# Patient Record
Sex: FEMALE | Race: BLACK OR AFRICAN AMERICAN | URBAN - METROPOLITAN AREA
[De-identification: names, ages, dates, MRNs, and addresses within clinical notes are randomized per-mention and may not be internally consistent; named-entity substitution may affect disease eponyms.]

---

## 2022-01-13 ENCOUNTER — OFFICE VISIT (OUTPATIENT)
Dept: URBAN - METROPOLITAN AREA CLINIC 98 | Facility: CLINIC | Age: 57
End: 2022-01-13

## 2022-01-14 ENCOUNTER — TELEPHONE ENCOUNTER (OUTPATIENT)
Dept: URBAN - METROPOLITAN AREA CLINIC 98 | Facility: CLINIC | Age: 57
End: 2022-01-14

## 2022-01-18 ENCOUNTER — WEB ENCOUNTER (OUTPATIENT)
Dept: URBAN - METROPOLITAN AREA CLINIC 98 | Facility: CLINIC | Age: 57
End: 2022-01-18

## 2022-01-20 ENCOUNTER — WEB ENCOUNTER (OUTPATIENT)
Dept: URBAN - METROPOLITAN AREA CLINIC 98 | Facility: CLINIC | Age: 57
End: 2022-01-20

## 2022-01-20 ENCOUNTER — OFFICE VISIT (OUTPATIENT)
Dept: URBAN - METROPOLITAN AREA CLINIC 98 | Facility: CLINIC | Age: 57
End: 2022-01-20
Payer: COMMERCIAL

## 2022-01-20 DIAGNOSIS — R14.0 ABDOMINAL BLOATING: ICD-10-CM

## 2022-01-20 DIAGNOSIS — K21.9 GERD: ICD-10-CM

## 2022-01-20 DIAGNOSIS — R19.4 CHANGE IN BOWEL HABIT: ICD-10-CM

## 2022-01-20 PROCEDURE — 99214 OFFICE O/P EST MOD 30 MIN: CPT | Performed by: INTERNAL MEDICINE

## 2022-01-20 RX ORDER — LEVOTHYROXINE SODIUM 0.07 MG/1
TAKE 1 TABLET (50 MCG) BY ORAL ROUTE ONCE DAILY TABLET ORAL 1
Qty: 0 | Refills: 0 | Status: ACTIVE | COMMUNITY
Start: 1900-01-01

## 2022-01-20 RX ORDER — METOPROLOL SUCCINATE 100 MG/1
TAKE 1 TABLET (100 MG) BY ORAL ROUTE ONCE DAILY TABLET, EXTENDED RELEASE ORAL 1
Qty: 0 | Refills: 0 | Status: ACTIVE | COMMUNITY
Start: 1900-01-01

## 2022-01-20 NOTE — HPI-TODAY'S VISIT:
Ms. Rodarte is a 57 yo F presenting for followup.   She was a patient of Dr. Bejarano. She has had GERD for years and was diagnosed with esophagitis and GERD on EGD in 2019. Over the holidays she had increased alcohol intake and feels she indulged on foods she does not normally eat. She experienced gas, abdominal bloating, and increased BMs that were loose. She had some acid reflux.   She is still having some abdominal distress. She is no longer having diarrhea. She is having soft-serve consistency stools. For the past 2 weeks she has increased smoothies and healthier foods. She is still having gas and abdominal bloating most days of the week.   She has tried Gaviscon which has helped her symptoms and is on protonix 20 mg PO daily. She has seen a few bubbles in her urine but no blood and no dysuria.   I personally reviewed: 10/21/2019 colonoscopy with polyp removed. Family history of colon cancer 10/21/2019 EGD: erythema at GEJ,  biopsied 10/21/2019 EGD path: reflux esophagitis. Stomach biopsies without H pylori, chronic inactive gastritis

## 2022-01-22 LAB
DEAMIDATED GLIADIN ABS, IGA: 7
IMMUNOGLOBULIN A, QN, SERUM: 468
T-TRANSGLUTAMINASE (TTG) IGA: 3

## 2022-01-28 ENCOUNTER — OFFICE VISIT (OUTPATIENT)
Dept: URBAN - METROPOLITAN AREA CLINIC 84 | Facility: CLINIC | Age: 57
End: 2022-01-28

## 2022-03-17 ENCOUNTER — TELEPHONE ENCOUNTER (OUTPATIENT)
Dept: URBAN - METROPOLITAN AREA CLINIC 98 | Facility: CLINIC | Age: 57
End: 2022-03-17

## 2022-07-06 ENCOUNTER — OFFICE VISIT (OUTPATIENT)
Dept: URBAN - METROPOLITAN AREA CLINIC 92 | Facility: CLINIC | Age: 57
End: 2022-07-06

## 2022-07-06 VITALS
DIASTOLIC BLOOD PRESSURE: 93 MMHG | HEIGHT: 61 IN | BODY MASS INDEX: 34.93 KG/M2 | HEART RATE: 54 BPM | SYSTOLIC BLOOD PRESSURE: 160 MMHG | WEIGHT: 185 LBS | TEMPERATURE: 97 F

## 2022-07-06 RX ORDER — METOPROLOL SUCCINATE 100 MG/1
TAKE 1 TABLET (100 MG) BY ORAL ROUTE ONCE DAILY TABLET, EXTENDED RELEASE ORAL 1
Qty: 0 | Refills: 0 | Status: ACTIVE | COMMUNITY
Start: 1900-01-01

## 2022-07-06 RX ORDER — LEVOTHYROXINE SODIUM 0.07 MG/1
TAKE 1 TABLET (50 MCG) BY ORAL ROUTE ONCE DAILY TABLET ORAL 1
Qty: 0 | Refills: 0 | Status: ACTIVE | COMMUNITY
Start: 1900-01-01

## 2022-08-18 ENCOUNTER — OFFICE VISIT (OUTPATIENT)
Dept: URBAN - METROPOLITAN AREA TELEHEALTH 2 | Facility: TELEHEALTH | Age: 57
End: 2022-08-18
Payer: COMMERCIAL

## 2022-08-18 VITALS — BODY MASS INDEX: 32.59 KG/M2 | WEIGHT: 172.6 LBS | HEIGHT: 61 IN

## 2022-08-18 DIAGNOSIS — R19.4 CHANGE IN BOWEL HABIT: ICD-10-CM

## 2022-08-18 DIAGNOSIS — Z83.71 FAMILY HISTORY OF COLONIC POLYPS: ICD-10-CM

## 2022-08-18 DIAGNOSIS — K21.9 GERD: ICD-10-CM

## 2022-08-18 DIAGNOSIS — R14.0 ABDOMINAL BLOATING: ICD-10-CM

## 2022-08-18 PROCEDURE — 99213 OFFICE O/P EST LOW 20 MIN: CPT | Performed by: INTERNAL MEDICINE

## 2022-08-18 RX ORDER — LEVOTHYROXINE SODIUM 0.07 MG/1
TAKE 1 TABLET (50 MCG) BY ORAL ROUTE ONCE DAILY TABLET ORAL 1
Qty: 0 | Refills: 0 | Status: ACTIVE | COMMUNITY
Start: 1900-01-01

## 2022-08-18 RX ORDER — METOPROLOL SUCCINATE 100 MG/1
TAKE 1 TABLET (100 MG) BY ORAL ROUTE ONCE DAILY TABLET, EXTENDED RELEASE ORAL 1
Qty: 0 | Refills: 0 | Status: ACTIVE | COMMUNITY
Start: 1900-01-01

## 2022-08-18 NOTE — HPI-TODAY'S VISIT:
58 yo female with long standing GERD. taking protonix 40 mg BID. doing dietary modification. exacerbated by etoh. only breakthrough with dietary lapse. BM normal

## 2022-12-14 ENCOUNTER — TELEPHONE ENCOUNTER (OUTPATIENT)
Dept: URBAN - METROPOLITAN AREA CLINIC 98 | Facility: CLINIC | Age: 57
End: 2022-12-14

## 2022-12-15 ENCOUNTER — OFFICE VISIT (OUTPATIENT)
Dept: URBAN - METROPOLITAN AREA CLINIC 105 | Facility: CLINIC | Age: 57
End: 2022-12-15

## 2022-12-19 ENCOUNTER — OFFICE VISIT (OUTPATIENT)
Dept: URBAN - METROPOLITAN AREA TELEHEALTH 2 | Facility: TELEHEALTH | Age: 57
End: 2022-12-19
Payer: COMMERCIAL

## 2022-12-19 VITALS — BODY MASS INDEX: 33.99 KG/M2 | WEIGHT: 180 LBS | HEIGHT: 61 IN

## 2022-12-19 DIAGNOSIS — K21.9 GERD: ICD-10-CM

## 2022-12-19 DIAGNOSIS — R14.0 ABDOMINAL BLOATING: ICD-10-CM

## 2022-12-19 PROBLEM — 414916001: Status: ACTIVE | Noted: 2022-12-19

## 2022-12-19 PROBLEM — 162864005: Status: ACTIVE | Noted: 2022-12-19

## 2022-12-19 PROCEDURE — 99214 OFFICE O/P EST MOD 30 MIN: CPT | Performed by: INTERNAL MEDICINE

## 2022-12-19 RX ORDER — OMEPRAZOLE 40 MG/1
1 CAPSULE 30 MINUTES BEFORE MORNING MEAL CAPSULE, DELAYED RELEASE ORAL TWICE A DAY
Qty: 60 | Refills: 3 | OUTPATIENT
Start: 2022-12-19

## 2022-12-19 RX ORDER — LEVOTHYROXINE SODIUM 0.07 MG/1
TAKE 1 TABLET (50 MCG) BY ORAL ROUTE ONCE DAILY TABLET ORAL 1
Qty: 0 | Refills: 0 | Status: ACTIVE | COMMUNITY
Start: 1900-01-01

## 2022-12-19 RX ORDER — METOPROLOL SUCCINATE 100 MG/1
TAKE 1 TABLET (100 MG) BY ORAL ROUTE ONCE DAILY TABLET, EXTENDED RELEASE ORAL 1
Qty: 0 | Refills: 0 | Status: ACTIVE | COMMUNITY
Start: 1900-01-01

## 2023-02-16 ENCOUNTER — OFFICE VISIT (OUTPATIENT)
Dept: URBAN - METROPOLITAN AREA CLINIC 105 | Facility: CLINIC | Age: 58
End: 2023-02-16

## 2023-03-16 ENCOUNTER — OFFICE VISIT (OUTPATIENT)
Dept: URBAN - METROPOLITAN AREA CLINIC 105 | Facility: CLINIC | Age: 58
End: 2023-03-16

## 2023-10-20 ENCOUNTER — LAB OUTSIDE AN ENCOUNTER (OUTPATIENT)
Dept: URBAN - METROPOLITAN AREA CLINIC 105 | Facility: CLINIC | Age: 58
End: 2023-10-20

## 2023-10-20 ENCOUNTER — OFFICE VISIT (OUTPATIENT)
Dept: URBAN - METROPOLITAN AREA CLINIC 105 | Facility: CLINIC | Age: 58
End: 2023-10-20
Payer: COMMERCIAL

## 2023-10-20 VITALS
HEIGHT: 61 IN | TEMPERATURE: 97.6 F | SYSTOLIC BLOOD PRESSURE: 173 MMHG | DIASTOLIC BLOOD PRESSURE: 83 MMHG | WEIGHT: 167 LBS | HEART RATE: 59 BPM | BODY MASS INDEX: 31.53 KG/M2

## 2023-10-20 DIAGNOSIS — E66.09 OTHER OBESITY DUE TO EXCESS CALORIES: ICD-10-CM

## 2023-10-20 DIAGNOSIS — R14.0 ABDOMINAL BLOATING: ICD-10-CM

## 2023-10-20 DIAGNOSIS — K21.9 GERD: ICD-10-CM

## 2023-10-20 PROBLEM — 428283002: Status: ACTIVE | Noted: 2023-10-20

## 2023-10-20 PROCEDURE — 99214 OFFICE O/P EST MOD 30 MIN: CPT | Performed by: INTERNAL MEDICINE

## 2023-10-20 RX ORDER — SUCRALFATE 1 G/1
1 TABLET ON AN EMPTY STOMACH TABLET ORAL TWICE A DAY
Qty: 60 | Refills: 1 | OUTPATIENT
Start: 2023-10-20 | End: 2023-12-18

## 2023-10-20 RX ORDER — PANTOPRAZOLE SODIUM 40 MG/1
1 TABLET TABLET, DELAYED RELEASE ORAL TWICE A DAY
Status: ACTIVE | COMMUNITY
Start: 2023-10-20

## 2023-10-20 RX ORDER — METOPROLOL SUCCINATE 100 MG/1
TAKE 1 TABLET (100 MG) BY ORAL ROUTE ONCE DAILY TABLET, EXTENDED RELEASE ORAL 1
Qty: 0 | Refills: 0 | Status: ACTIVE | COMMUNITY
Start: 1900-01-01

## 2023-10-20 RX ORDER — FAMOTIDINE 40 MG/1
1 TABLET AT BEDTIME TABLET, FILM COATED ORAL ONCE A DAY
Status: ACTIVE | COMMUNITY
Start: 2023-10-20

## 2023-10-20 RX ORDER — LEVOTHYROXINE SODIUM 0.07 MG/1
TAKE 1 TABLET (50 MCG) BY ORAL ROUTE ONCE DAILY TABLET ORAL 1
Qty: 0 | Refills: 0 | Status: ACTIVE | COMMUNITY
Start: 1900-01-01

## 2023-10-20 RX ORDER — OMEPRAZOLE 40 MG/1
1 CAPSULE 30 MINUTES BEFORE MORNING MEAL CAPSULE, DELAYED RELEASE ORAL TWICE A DAY
Qty: 60 | Refills: 3 | Status: ON HOLD | COMMUNITY
Start: 2022-12-19

## 2023-10-20 NOTE — HPI-TODAY'S VISIT:
Patient is here as a follow up for GERD. Previously seen by Dr. Goins. For the past 2 weeks, patient has been experiencing heartburn, abdominal gas, "gurgling", gas "pains". Feels like the gastritis she has had in the past. Symptoms are calmed with eating. Symptoms restart shortly after. "When I swallow, I feel discomfort when it travels down". Epigastric pressure.  The heartburn was worse at night. Gaviscon used to help in the past, but she opted out of taking it this time. She now uses Pepcid at night for the past 5 days with some relief.  Denies regurgitation, melena and unintentional weight loss.  Currently, taking Pantoprazole 40 mg BID and Pepcid 40 mg at night. Had her annual physical on Monday the lab work was WNL (I price reviewed this on [facility]).  Last EGD 10/2019 with Dr. Bejarano showed chronic gastritis and esophagitis at GE junction.  No longer doing the juice plus. It was 2 expensive. She is now taking [place] whole food vitamins and eating vegetables. She still avoids the vegetables that causes bloating.  Reports seeing "pencil thin" stools for the past 2 weeks. Denies rectal bleeding.

## 2023-11-29 ENCOUNTER — CLAIMS CREATED FROM THE CLAIM WINDOW (OUTPATIENT)
Dept: URBAN - METROPOLITAN AREA SURGERY CENTER 16 | Facility: SURGERY CENTER | Age: 58
End: 2023-11-29
Payer: COMMERCIAL

## 2023-11-29 ENCOUNTER — CLAIMS CREATED FROM THE CLAIM WINDOW (OUTPATIENT)
Dept: URBAN - METROPOLITAN AREA CLINIC 4 | Facility: CLINIC | Age: 58
End: 2023-11-29
Payer: COMMERCIAL

## 2023-11-29 DIAGNOSIS — R10.13 ABDOMINAL DISCOMFORT, EPIGASTRIC: ICD-10-CM

## 2023-11-29 DIAGNOSIS — R13.19 CERVICAL DYSPHAGIA: ICD-10-CM

## 2023-11-29 DIAGNOSIS — K20.80 ESOPHAGITIS DISSECANS SUPERFICIALIS: ICD-10-CM

## 2023-11-29 DIAGNOSIS — Z12.11 COLON CANCER SCREENING: ICD-10-CM

## 2023-11-29 DIAGNOSIS — K30 ACID INDIGESTION: ICD-10-CM

## 2023-11-29 DIAGNOSIS — K44.9 DIAPHRAGMATIC HERNIA: ICD-10-CM

## 2023-11-29 DIAGNOSIS — K57.30 ACQUIRED DIVERTICULOSIS OF COLON: ICD-10-CM

## 2023-11-29 DIAGNOSIS — K31.89 ACHYLIA: ICD-10-CM

## 2023-11-29 DIAGNOSIS — K64.8 EXTERNAL HEMORRHOIDS: ICD-10-CM

## 2023-11-29 DIAGNOSIS — D17.5 BENIGN LIPOMATOUS NEOPLASM OF INTRA-ABDOMINAL ORGANS: ICD-10-CM

## 2023-11-29 DIAGNOSIS — K63.5 BENIGN COLON POLYP: ICD-10-CM

## 2023-11-29 DIAGNOSIS — K29.70 GASTRITIS, UNSPECIFIED, WITHOUT BLEEDING: ICD-10-CM

## 2023-11-29 DIAGNOSIS — K31.7 BENIGN GASTRIC POLYP: ICD-10-CM

## 2023-11-29 PROCEDURE — 00813 ANES UPR LWR GI NDSC PX: CPT

## 2023-11-29 PROCEDURE — 00813 ANES UPR LWR GI NDSC PX: CPT | Performed by: ANESTHESIOLOGY

## 2023-11-29 PROCEDURE — 43239 EGD BIOPSY SINGLE/MULTIPLE: CPT | Performed by: INTERNAL MEDICINE

## 2023-11-29 PROCEDURE — 45380 COLONOSCOPY AND BIOPSY: CPT | Performed by: INTERNAL MEDICINE

## 2023-11-29 PROCEDURE — G8907 PT DOC NO EVENTS ON DISCHARG: HCPCS | Performed by: INTERNAL MEDICINE

## 2023-11-29 PROCEDURE — 88305 TISSUE EXAM BY PATHOLOGIST: CPT | Performed by: PATHOLOGY

## 2023-11-29 PROCEDURE — 88312 SPECIAL STAINS GROUP 1: CPT | Performed by: PATHOLOGY

## 2023-12-08 ENCOUNTER — TELEPHONE ENCOUNTER (OUTPATIENT)
Dept: URBAN - METROPOLITAN AREA CLINIC 105 | Facility: CLINIC | Age: 58
End: 2023-12-08

## 2023-12-21 ENCOUNTER — OFFICE VISIT (OUTPATIENT)
Dept: URBAN - METROPOLITAN AREA CLINIC 105 | Facility: CLINIC | Age: 58
End: 2023-12-21
Payer: COMMERCIAL

## 2023-12-21 VITALS
HEIGHT: 61 IN | DIASTOLIC BLOOD PRESSURE: 86 MMHG | HEART RATE: 56 BPM | TEMPERATURE: 96.6 F | BODY MASS INDEX: 31.15 KG/M2 | WEIGHT: 165 LBS | SYSTOLIC BLOOD PRESSURE: 147 MMHG

## 2023-12-21 DIAGNOSIS — K44.9 HIATAL HERNIA: ICD-10-CM

## 2023-12-21 DIAGNOSIS — D17.79 LIPOMA OF OTHER SPECIFIED SITES: ICD-10-CM

## 2023-12-21 DIAGNOSIS — K21.9 CHRONIC GERD: ICD-10-CM

## 2023-12-21 DIAGNOSIS — E66.01 MORBID OBESITY: ICD-10-CM

## 2023-12-21 PROBLEM — 238136002: Status: ACTIVE | Noted: 2023-12-21

## 2023-12-21 PROBLEM — 235595009: Status: ACTIVE | Noted: 2023-12-21

## 2023-12-21 PROCEDURE — 99214 OFFICE O/P EST MOD 30 MIN: CPT | Performed by: INTERNAL MEDICINE

## 2023-12-21 RX ORDER — PANTOPRAZOLE SODIUM 40 MG/1
1 TABLET TABLET, DELAYED RELEASE ORAL TWICE A DAY
Status: ACTIVE | COMMUNITY
Start: 2023-10-20

## 2023-12-21 RX ORDER — LEVOTHYROXINE SODIUM 0.07 MG/1
TAKE 1 TABLET (50 MCG) BY ORAL ROUTE ONCE DAILY TABLET ORAL 1
Qty: 0 | Refills: 0 | Status: ACTIVE | COMMUNITY
Start: 1900-01-01

## 2023-12-21 RX ORDER — FAMOTIDINE 40 MG/1
1 TABLET AT BEDTIME TABLET, FILM COATED ORAL ONCE A DAY
Status: ACTIVE | COMMUNITY
Start: 2023-10-20

## 2023-12-21 RX ORDER — METOPROLOL SUCCINATE 100 MG/1
TAKE 1 TABLET (100 MG) BY ORAL ROUTE ONCE DAILY TABLET, EXTENDED RELEASE ORAL 1
Qty: 0 | Refills: 0 | Status: ACTIVE | COMMUNITY
Start: 1900-01-01

## 2023-12-21 NOTE — HPI-TODAY'S VISIT:
The pt has a history of GERD, hiatal hernia and obesiy who presents for f/u office visit. The pt is s/p El/C 11/23 + for hiatal hernia, GERD, gastritis and colon + for lipoma. The pt continues to struggle with weight loss and she notes that she does exercise but needs to lose weight. The pt notes that she is trying to lose weight and does not exercise consistently.   Time of visit DOS is 30 minutes after review of all old records.

## 2023-12-21 NOTE — PHYSICAL EXAM HENT:
Head, normocephalic, atraumatic, Face, Face within normal limits, Ears, External ears within normal limits, Nose/Nasopharynx, External nose normal appearance, nares patent, no nasal discharge, Mouth and Throat, Oral cavity appearance normal, Lips, Appearance normal
yes

## 2024-04-18 ENCOUNTER — OV EP (OUTPATIENT)
Dept: URBAN - METROPOLITAN AREA CLINIC 105 | Facility: CLINIC | Age: 59
End: 2024-04-18
Payer: COMMERCIAL

## 2024-04-18 VITALS
TEMPERATURE: 97 F | SYSTOLIC BLOOD PRESSURE: 134 MMHG | WEIGHT: 167 LBS | HEIGHT: 61 IN | DIASTOLIC BLOOD PRESSURE: 85 MMHG | HEART RATE: 62 BPM | BODY MASS INDEX: 31.53 KG/M2

## 2024-04-18 DIAGNOSIS — E66.01 MORBID OBESITY: ICD-10-CM

## 2024-04-18 DIAGNOSIS — K64.8 INTERNAL HEMORRHOIDS: ICD-10-CM

## 2024-04-18 DIAGNOSIS — D17.79 LIPOMA OF OTHER SPECIFIED SITES: ICD-10-CM

## 2024-04-18 DIAGNOSIS — K21.9 CHRONIC GERD: ICD-10-CM

## 2024-04-18 DIAGNOSIS — K44.9 HIATAL HERNIA: ICD-10-CM

## 2024-04-18 PROCEDURE — 99214 OFFICE O/P EST MOD 30 MIN: CPT

## 2024-04-18 RX ORDER — FAMOTIDINE 40 MG/1
1 TABLET AT BEDTIME TABLET, FILM COATED ORAL ONCE A DAY
Status: ACTIVE | COMMUNITY
Start: 2023-10-20

## 2024-04-18 RX ORDER — PANTOPRAZOLE SODIUM 40 MG/1
1 TABLET TABLET, DELAYED RELEASE ORAL TWICE A DAY
Status: ACTIVE | COMMUNITY
Start: 2023-10-20

## 2024-04-18 RX ORDER — LEVOTHYROXINE SODIUM 0.07 MG/1
TAKE 1 TABLET (50 MCG) BY ORAL ROUTE ONCE DAILY TABLET ORAL 1
Qty: 0 | Refills: 0 | Status: ACTIVE | COMMUNITY
Start: 1900-01-01

## 2024-04-18 RX ORDER — METOPROLOL SUCCINATE 100 MG/1
TAKE 1 TABLET (100 MG) BY ORAL ROUTE ONCE DAILY TABLET, EXTENDED RELEASE ORAL 1
Qty: 0 | Refills: 0 | Status: ACTIVE | COMMUNITY
Start: 1900-01-01

## 2024-04-18 RX ORDER — PANTOPRAZOLE SODIUM 40 MG/1
1 TABLET 30 MINS BEFORE FIRST AND LAST MEAL OF THE DAY TABLET, DELAYED RELEASE ORAL TWICE A DAY
Qty: 180 | Refills: 1
Start: 2023-10-20

## 2024-04-18 NOTE — HPI-TODAY'S VISIT:
12/21/2023 The pt has a history of GERD, hiatal hernia and obesity who presents for f/u office visit. The pt is s/p El/C 11/23 + for hiatal hernia, GERD, gastritis and colon + for lipoma. The pt continues to struggle with weight loss and she notes that she does exercise but needs to lose weight. The pt notes that she is trying to lose weight and does not exercise consistently.   04/17/2023 Pt presents for f/u of GERD. States that last week she began to have a flare, which she related to increased stress with work. Symptoms include "rumbling" and gassiness, epigastric "stomach upset." She notes that when she is stressed, she does not eat as well as she should. She is taking pantoprazole 40 mg BID; she was taking OTC famotidine 20 mg. She plans to increase this to 40 mg at bedtime. She plans to workw ith a dietician and increase exercise to lose weight. She also had rectal bleeding and hemorrhoids earlier this year and was prescribed Anusol suppositories x 2 weeks by us. She finished these and this has now been resolved. States she has regular BMs unless she is not drinking enough water.

## 2024-08-27 NOTE — HPI-TODAY'S VISIT:
Ms. Rodarte is a 56 yo F presenting for followup.   Last saw Dr. Beard 22.   2 weeks ago started to have heartburn and acid regurgitation, has finally started to improve. Last flare was 2022. She thinks food she ate triggered this flare.   Was taking Mylanta and Gas Ex during the heartburn flare. Also drinking alkaline water.  She was still taking Protonix 40 mg once daily and increased it to twice daily. Not taking it before meals.  She does not have heartburn regularly during the week. Had a physical in 2022.  H/o gastric cancer (grandmother)  Still having some abdominal bloating with vegetables. Was taking Juice Plus  2019- last EGD Due for colonoscopy      Patient seen today via telehealth by agreement and consent of patient in light of current COVID-19 pandemic. I used video conferencing during the visit. The patient encounter is appropriate and reasonable under the circumstances given the patient's particular presentation at this time. The patient has been advised of the followin) the potential risks and limitations of this mode of treatment (including but not limited to the absence of in-person examination); 2) the right to refuse telehealth services at any point without affecting the right to future care; 3) the right to receive in-person services, included immediately after this consultation if an urgent need arises; 4) information, including identifiable images or information from this telehealth consult, will only be shared in accordance with HIPPA regulations. Any and all of the patient's and/or patient's family member's questions on this issue have been answered. The patient has verbally consented to be treated via telehealth services. The patient has also been advised to contact this office for worsening conditions or problems, and seek emergency medical treatment and/or call 911 if the patient deems either necessary.   More than half of the face-to-face time used for counseling and coordination of care.  The patient received telehealth services at: home Prepped: groin. Prepped with: ChloraPrep. The site was clipped.

## 2024-10-08 ENCOUNTER — WEB ENCOUNTER (OUTPATIENT)
Dept: URBAN - METROPOLITAN AREA CLINIC 105 | Facility: CLINIC | Age: 59
End: 2024-10-08

## 2024-10-18 ENCOUNTER — OFFICE VISIT (OUTPATIENT)
Dept: URBAN - METROPOLITAN AREA CLINIC 105 | Facility: CLINIC | Age: 59
End: 2024-10-18
Payer: COMMERCIAL

## 2024-10-18 ENCOUNTER — DASHBOARD ENCOUNTERS (OUTPATIENT)
Age: 59
End: 2024-10-18

## 2024-10-18 VITALS
BODY MASS INDEX: 32.47 KG/M2 | SYSTOLIC BLOOD PRESSURE: 148 MMHG | TEMPERATURE: 97.9 F | DIASTOLIC BLOOD PRESSURE: 85 MMHG | WEIGHT: 172 LBS | HEIGHT: 61 IN | HEART RATE: 52 BPM

## 2024-10-18 DIAGNOSIS — K44.9 HIATAL HERNIA: ICD-10-CM

## 2024-10-18 DIAGNOSIS — D17.79 LIPOMA OF OTHER SPECIFIED SITES: ICD-10-CM

## 2024-10-18 DIAGNOSIS — K21.9 CHRONIC GERD: ICD-10-CM

## 2024-10-18 DIAGNOSIS — E66.01 MORBID OBESITY: ICD-10-CM

## 2024-10-18 DIAGNOSIS — K64.8 INTERNAL HEMORRHOIDS: ICD-10-CM

## 2024-10-18 PROCEDURE — 99213 OFFICE O/P EST LOW 20 MIN: CPT | Performed by: INTERNAL MEDICINE

## 2024-10-18 RX ORDER — METOPROLOL SUCCINATE 100 MG/1
TAKE 1 TABLET (100 MG) BY ORAL ROUTE ONCE DAILY TABLET, EXTENDED RELEASE ORAL 1
Qty: 0 | Refills: 0 | Status: ACTIVE | COMMUNITY
Start: 1900-01-01

## 2024-10-18 RX ORDER — FAMOTIDINE 40 MG/1
1 TABLET AT BEDTIME TABLET, FILM COATED ORAL ONCE A DAY
Status: ACTIVE | COMMUNITY
Start: 2023-10-20

## 2024-10-18 RX ORDER — LEVOTHYROXINE SODIUM 0.07 MG/1
TAKE 1 TABLET (50 MCG) BY ORAL ROUTE ONCE DAILY TABLET ORAL 1
Qty: 0 | Refills: 0 | Status: ACTIVE | COMMUNITY
Start: 1900-01-01

## 2024-10-18 RX ORDER — PANTOPRAZOLE SODIUM 40 MG/1
1 TABLET 30 MINS BEFORE FIRST AND LAST MEAL OF THE DAY TABLET, DELAYED RELEASE ORAL TWICE A DAY
Qty: 180 | Refills: 1

## 2024-10-18 RX ORDER — PANTOPRAZOLE SODIUM 40 MG/1
1 TABLET 30 MINS BEFORE FIRST AND LAST MEAL OF THE DAY TABLET, DELAYED RELEASE ORAL TWICE A DAY
Qty: 180 | Refills: 1 | Status: ACTIVE | COMMUNITY
Start: 2023-10-20

## 2024-10-18 NOTE — HPI-TODAY'S VISIT:
12/21/2023 The pt has a history of GERD, hiatal hernia and obesity who presents for f/u office visit. The pt is s/p El/C 11/23 + for hiatal hernia, GERD, gastritis and colon + for lipoma. The pt continues to struggle with weight loss and she notes that she does exercise but needs to lose weight. The pt notes that she is trying to lose weight and does not exercise consistently.   04/18/2024  Pt presents for f/u of GERD. States that last week she began to have a flare, which she related to increased stress with work. Symptoms include "rumbling" and gassiness, epigastric "stomach upset." She notes that when she is stressed, she does not eat as well as she should. She is taking pantoprazole 40 mg BID; she was taking OTC famotidine 20 mg. She plans to increase this to 40 mg at bedtime. She plans to work with a dietician and increase exercise to lose weight. She also had rectal bleeding and hemorrhoids earlier this year and was prescribed Anusol suppositories x 2 weeks by us. She finished these and this has now been resolved. States she has regular BMs unless she is not drinking enough water.  10/18/2024 Pt presents for f/u. At last visit, she was continued on pantoprazole 40 mg BID with famotidine 40 mg at bedtime PRN. Today, pt states she will have breakthrough GERD sx every once in a while, which she thinks is related to what she eats. She notes she needs to monitor diet to figure out what is causing it.  She is taking pantoprazole 40 mg BID. She takes the morning dose before she eats, but realized that she is taking the evening dose about 2-3 hours after she eats. She takes this with famotidine 20 mg at bedtime. She is wondering if it is safe to take her medicines and vitamins with the PPI. She is also wondering whether she should have hiatal hernia surgery. She is no longer seeing dietician but trying to maintain those habits. She lost some weight but recently gained some back, so she plans to work on things again.

## 2024-10-30 ENCOUNTER — OFFICE VISIT (OUTPATIENT)
Dept: URBAN - METROPOLITAN AREA TELEHEALTH 2 | Facility: TELEHEALTH | Age: 59
End: 2024-10-30

## 2024-10-31 ENCOUNTER — OFFICE VISIT (OUTPATIENT)
Dept: URBAN - METROPOLITAN AREA TELEHEALTH 2 | Facility: TELEHEALTH | Age: 59
End: 2024-10-31

## 2024-11-01 ENCOUNTER — OFFICE VISIT (OUTPATIENT)
Dept: URBAN - METROPOLITAN AREA TELEHEALTH 2 | Facility: TELEHEALTH | Age: 59
End: 2024-11-01

## 2025-05-13 ENCOUNTER — ERX REFILL RESPONSE (OUTPATIENT)
Dept: URBAN - METROPOLITAN AREA CLINIC 105 | Facility: CLINIC | Age: 60
End: 2025-05-13

## 2025-05-13 RX ORDER — PANTOPRAZOLE SODIUM 40 MG/1
1 TABLET 30 MINS BEFORE FIRST AND LAST MEAL OF THE DAY TABLET, DELAYED RELEASE ORAL TWICE A DAY
Qty: 30 | Refills: 0

## 2025-06-16 ENCOUNTER — OFFICE VISIT (OUTPATIENT)
Dept: URBAN - METROPOLITAN AREA TELEHEALTH 2 | Facility: TELEHEALTH | Age: 60
End: 2025-06-16
Payer: COMMERCIAL

## 2025-06-16 DIAGNOSIS — K57.30 DIVERTICULOSIS OF COLON: ICD-10-CM

## 2025-06-16 DIAGNOSIS — D17.79 LIPOMA OF OTHER SPECIFIED SITES: ICD-10-CM

## 2025-06-16 DIAGNOSIS — K59.01 SLOW TRANSIT CONSTIPATION: ICD-10-CM

## 2025-06-16 DIAGNOSIS — K44.9 HIATAL HERNIA: ICD-10-CM

## 2025-06-16 DIAGNOSIS — K64.8 INTERNAL HEMORRHOIDS: ICD-10-CM

## 2025-06-16 DIAGNOSIS — K21.9 CHRONIC GERD: ICD-10-CM

## 2025-06-16 DIAGNOSIS — E66.811 CLASS 1 OBESITY: ICD-10-CM

## 2025-06-16 PROBLEM — 733657002: Status: ACTIVE | Noted: 2025-06-16

## 2025-06-16 PROBLEM — 35298007: Status: ACTIVE | Noted: 2025-06-16

## 2025-06-16 PROCEDURE — 99213 OFFICE O/P EST LOW 20 MIN: CPT | Performed by: INTERNAL MEDICINE

## 2025-06-16 RX ORDER — PANTOPRAZOLE SODIUM 40 MG/1
1 TABLET 30 MINS BEFORE FIRST AND LAST MEAL OF THE DAY TABLET, DELAYED RELEASE ORAL TWICE A DAY
Qty: 30 | Refills: 0 | Status: ACTIVE | COMMUNITY

## 2025-06-16 RX ORDER — FAMOTIDINE 40 MG/1
1 TABLET AT BEDTIME TABLET, FILM COATED ORAL ONCE A DAY
Status: ACTIVE | COMMUNITY
Start: 2023-10-20

## 2025-06-16 RX ORDER — LEVOTHYROXINE SODIUM 0.07 MG/1
TAKE 1 TABLET (50 MCG) BY ORAL ROUTE ONCE DAILY TABLET ORAL 1
Qty: 0 | Refills: 0 | Status: ACTIVE | COMMUNITY
Start: 1900-01-01

## 2025-06-16 RX ORDER — PANTOPRAZOLE SODIUM 40 MG/1
1 TABLET 30 MINS BEFORE FIRST AND LAST MEAL OF THE DAY TABLET, DELAYED RELEASE ORAL TWICE A DAY
Qty: 180 | Refills: 3
Start: 2025-06-16

## 2025-06-16 RX ORDER — METOPROLOL SUCCINATE 100 MG/1
TAKE 1 TABLET (100 MG) BY ORAL ROUTE ONCE DAILY TABLET, EXTENDED RELEASE ORAL 1
Qty: 0 | Refills: 0 | Status: ACTIVE | COMMUNITY
Start: 1900-01-01

## 2025-06-16 NOTE — HPI-TODAY'S VISIT:
12/21/2023 The pt has a history of GERD, hiatal hernia and obesity who presents for f/u office visit. The pt is s/p El/C 11/23 + for hiatal hernia, GERD, gastritis and colon + for lipoma. The pt continues to struggle with weight loss and she notes that she does exercise but needs to lose weight. The pt notes that she is trying to lose weight and does not exercise consistently.   04/18/2024  Pt presents for f/u of GERD. States that last week she began to have a flare, which she related to increased stress with work. Symptoms include "rumbling" and gassiness, epigastric "stomach upset." She notes that when she is stressed, she does not eat as well as she should. She is taking pantoprazole 40 mg BID; she was taking OTC famotidine 20 mg. She plans to increase this to 40 mg at bedtime. She plans to work with a dietician and increase exercise to lose weight. She also had rectal bleeding and hemorrhoids earlier this year and was prescribed Anusol suppositories x 2 weeks by us. She finished these and this has now been resolved. States she has regular BMs unless she is not drinking enough water.  10/18/2024 Pt presents for f/u. At last visit, she was continued on pantoprazole 40 mg BID with famotidine 40 mg at bedtime PRN. Today, pt states she will have breakthrough GERD sx every once in a while, which she thinks is related to what she eats. She notes she needs to monitor diet to figure out what is causing it.  She is taking pantoprazole 40 mg BID. She takes the morning dose before she eats, but realized that she is taking the evening dose about 2-3 hours after she eats. She takes this with famotidine 20 mg at bedtime. She is wondering if it is safe to take her medicines and vitamins with the PPI. She is also wondering whether she should have hiatal hernia surgery. She is no longer seeing dietician but trying to maintain those habits. She lost some weight but recently gained some back, so she plans to work on things again.  06/16/25 Here for a follow up via Telehealth.  States after discussing proper adminstration of PPI BID and H2 blocker QHS. Notes she is asymptomatic on this regimen mostly. She does mention one instance of "rubblinging and a lot of gas" after ingesting a spicy breakfast burrito. Gas-X helps She states she knows her triggers are peppers.  She also mentions eating a lot of salads. She mentions she wants to remain on PPI BID. She notes she has normal BMs daily of solid-loose stools. No rectal bleeding. This visit was a total of 17 minutes.